# Patient Record
Sex: FEMALE | Race: WHITE
[De-identification: names, ages, dates, MRNs, and addresses within clinical notes are randomized per-mention and may not be internally consistent; named-entity substitution may affect disease eponyms.]

---

## 2017-09-11 ENCOUNTER — HOSPITAL ENCOUNTER (OUTPATIENT)
Dept: HOSPITAL 61 - MAMMO | Age: 77
Discharge: HOME | End: 2017-09-11
Attending: FAMILY MEDICINE
Payer: MEDICARE

## 2017-09-11 DIAGNOSIS — Z12.31: Primary | ICD-10-CM

## 2017-09-11 NOTE — RAD
DATE: 9/11/2017



EXAM: DIGITAL SCREEN BILAT W/CAD



HISTORY: Routine screening



COMPARISON: 9/6/2016, a 20/2/2016



This study was interpreted with the benefit of Computerized Aided Detection

(CAD).



The breast parenchyma shows scattered fibroglandular densities. Breast

parenchyma level B.



FINDINGS: The fibroglandular pattern is somewhat nodular in character. No new

or enlarging breast densities are seen. Benign type calcifications are

present. No suspicious microcalcifications have developed.



IMPRESSION: Stable mammograms without evidence of malignancy.





BI-RADS CATEGORY: 2 BENIGN FINDING(S)



RECOMMENDED FOLLOW-UP: 12M 12 MONTH FOLLOW-UP



PQRS compliance statement: Patient information was entered into a reminder

system with a target due date     for the next mammogram.



Mammography is a sensitive method for finding small breast cancers, but it

does not detect them all and is not a substitute for careful clinical

examination.  A negative mammogram does not negate a clinically suspicious

finding and should not result in delay in biopsying a clinically suspicious

abnormality.



"Our facility is accredited by the [employer] Mammography

Program."

## 2018-09-13 ENCOUNTER — HOSPITAL ENCOUNTER (OUTPATIENT)
Dept: HOSPITAL 61 - MAMMO | Age: 78
Discharge: HOME | End: 2018-09-13
Attending: FAMILY MEDICINE
Payer: MEDICARE

## 2018-09-13 DIAGNOSIS — Z12.31: Primary | ICD-10-CM

## 2018-09-13 PROCEDURE — 77063 BREAST TOMOSYNTHESIS BI: CPT

## 2018-09-13 PROCEDURE — 77067 SCR MAMMO BI INCL CAD: CPT

## 2018-09-13 NOTE — RAD
DATE: 9/13/2018



EXAM: MAMMO MUNIR SCREENING BILATERAL



HISTORY: Routine screening



COMPARISON: 9/11/2017



This study was interpreted with the benefit of Computerized Aided Detection

(CAD).





Breast Density: SCATTERED The breast parenchyma shows scattered fibroglandular

densities. Breast parenchyma level B.





FINDINGS: 2-D and 3-D tomosynthesis imaging was performed in CC and MLO

projections.  The fibroglandular tissues are multinodular in character.  No

new or enlarging breast densities are seen.  There are scattered

microcalcifications which have shown no significant interval change.  





IMPRESSION: Stable mammograms without evidence of malignancy.





BI-RADS CATEGORY: 2 BENIGN FINDING(S)



RECOMMENDED FOLLOW-UP: 12M 12 MONTH FOLLOW-UP



PQRS compliance statement: Patient information was entered into a reminder

system with a target due date     for the next mammogram.



Mammography is a sensitive method for finding small breast cancers, but it

does not detect them all and is not a substitute for careful clinical

examination.  A negative mammogram does not negate a clinically suspicious

finding and should not result in delay in biopsying a clinically suspicious

abnormality.



"Our facility is accredited by the [employer] Mammography

Program."

## 2019-02-25 ENCOUNTER — HOSPITAL ENCOUNTER (EMERGENCY)
Dept: HOSPITAL 61 - ER | Age: 79
Discharge: HOME | End: 2019-02-25
Payer: MEDICARE

## 2019-02-25 VITALS — HEIGHT: 64.5 IN | WEIGHT: 159 LBS | BODY MASS INDEX: 26.81 KG/M2

## 2019-02-25 VITALS
DIASTOLIC BLOOD PRESSURE: 85 MMHG | DIASTOLIC BLOOD PRESSURE: 85 MMHG | DIASTOLIC BLOOD PRESSURE: 85 MMHG | DIASTOLIC BLOOD PRESSURE: 85 MMHG | SYSTOLIC BLOOD PRESSURE: 151 MMHG | SYSTOLIC BLOOD PRESSURE: 151 MMHG | SYSTOLIC BLOOD PRESSURE: 151 MMHG | SYSTOLIC BLOOD PRESSURE: 151 MMHG

## 2019-02-25 DIAGNOSIS — E86.0: ICD-10-CM

## 2019-02-25 DIAGNOSIS — M10.9: ICD-10-CM

## 2019-02-25 DIAGNOSIS — R07.89: ICD-10-CM

## 2019-02-25 DIAGNOSIS — R11.0: Primary | ICD-10-CM

## 2019-02-25 DIAGNOSIS — R03.0: ICD-10-CM

## 2019-02-25 LAB
ALBUMIN SERPL-MCNC: 3.6 G/DL (ref 3.4–5)
ALBUMIN/GLOB SERPL: 0.9 {RATIO} (ref 1–1.7)
ALP SERPL-CCNC: 85 U/L (ref 46–116)
ALT SERPL-CCNC: 28 U/L (ref 14–59)
ANION GAP SERPL CALC-SCNC: 13 MMOL/L (ref 6–14)
APTT PPP: YELLOW S
AST SERPL-CCNC: 24 U/L (ref 15–37)
BACTERIA #/AREA URNS HPF: 0 /HPF
BASOPHILS # BLD AUTO: 0.1 X10^3/UL (ref 0–0.2)
BASOPHILS NFR BLD: 1 % (ref 0–3)
BILIRUB SERPL-MCNC: 1.6 MG/DL (ref 0.2–1)
BILIRUB UR QL STRIP: NEGATIVE
BUN SERPL-MCNC: 24 MG/DL (ref 7–20)
BUN/CREAT SERPL: 22 (ref 6–20)
CALCIUM SERPL-MCNC: 9.2 MG/DL (ref 8.5–10.1)
CHLORIDE SERPL-SCNC: 95 MMOL/L (ref 98–107)
CO2 SERPL-SCNC: 27 MMOL/L (ref 21–32)
CREAT SERPL-MCNC: 1.1 MG/DL (ref 0.6–1)
EOSINOPHIL NFR BLD: 0 X10^3/UL (ref 0–0.7)
EOSINOPHIL NFR BLD: 1 % (ref 0–3)
ERYTHROCYTE [DISTWIDTH] IN BLOOD BY AUTOMATED COUNT: 13.4 % (ref 11.5–14.5)
FIBRINOGEN PPP-MCNC: CLEAR MG/DL
GFR SERPLBLD BASED ON 1.73 SQ M-ARVRAT: 47.9 ML/MIN
GLOBULIN SER-MCNC: 3.8 G/DL (ref 2.2–3.8)
GLUCOSE SERPL-MCNC: 126 MG/DL (ref 70–99)
HCT VFR BLD CALC: 48.4 % (ref 36–47)
HGB BLD-MCNC: 16.7 G/DL (ref 12–15.5)
HYALINE CASTS #/AREA URNS LPF: (no result) /HPF
INFLUENZA A PATIENT: NEGATIVE
INFLUENZA B PATIENT: NEGATIVE
LYMPHOCYTES # BLD: 1.4 X10^3/UL (ref 1–4.8)
LYMPHOCYTES NFR BLD AUTO: 15 % (ref 24–48)
MCH RBC QN AUTO: 32 PG (ref 25–35)
MCHC RBC AUTO-ENTMCNC: 35 G/DL (ref 31–37)
MCV RBC AUTO: 92 FL (ref 79–100)
MONO #: 0.3 X10^3/UL (ref 0–1.1)
MONOCYTES NFR BLD: 4 % (ref 0–9)
NEUT #: 7.5 X10^3UL (ref 1.8–7.7)
NEUTROPHILS NFR BLD AUTO: 80 % (ref 31–73)
NITRITE UR QL STRIP: NEGATIVE
PH UR STRIP: 6 [PH]
PLATELET # BLD AUTO: 256 X10^3/UL (ref 140–400)
POTASSIUM SERPL-SCNC: 4 MMOL/L (ref 3.5–5.1)
PROT SERPL-MCNC: 7.4 G/DL (ref 6.4–8.2)
PROT UR STRIP-MCNC: NEGATIVE MG/DL
RBC # BLD AUTO: 5.28 X10^6/UL (ref 3.5–5.4)
RBC #/AREA URNS HPF: 0 /HPF (ref 0–2)
SODIUM SERPL-SCNC: 135 MMOL/L (ref 136–145)
SQUAMOUS #/AREA URNS LPF: (no result) /LPF
UROBILINOGEN UR-MCNC: 1 MG/DL
WBC # BLD AUTO: 9.4 X10^3/UL (ref 4–11)
WBC #/AREA URNS HPF: (no result) /HPF (ref 0–4)

## 2019-02-25 PROCEDURE — 96360 HYDRATION IV INFUSION INIT: CPT

## 2019-02-25 PROCEDURE — 96361 HYDRATE IV INFUSION ADD-ON: CPT

## 2019-02-25 PROCEDURE — 71046 X-RAY EXAM CHEST 2 VIEWS: CPT

## 2019-02-25 PROCEDURE — 81001 URINALYSIS AUTO W/SCOPE: CPT

## 2019-02-25 PROCEDURE — 87804 INFLUENZA ASSAY W/OPTIC: CPT

## 2019-02-25 PROCEDURE — 36415 COLL VENOUS BLD VENIPUNCTURE: CPT

## 2019-02-25 PROCEDURE — 80053 COMPREHEN METABOLIC PANEL: CPT

## 2019-02-25 PROCEDURE — 85025 COMPLETE CBC W/AUTO DIFF WBC: CPT

## 2019-02-25 PROCEDURE — 99284 EMERGENCY DEPT VISIT MOD MDM: CPT

## 2019-02-25 NOTE — PHYS DOC
Adult General


Chief Complaint


Chief Complaint:  WEAKNESS/GENERALIZED





HPI


HPI





Patient is a 78  year old female who presents to the due to chief complaint of 

not feeling well. She states that she has other symptoms of the last 2 weeks. 

Patient's primary care physician last week and was prescribed Levaquin. She 

developed nausea after taking the Levaquin on Friday she called her doctor's 

office and she was prescribed Zofran. Patient states that she had fever at the 

beginning of the week but has not attempted fever for the last 3 days. Patient 

denies chills, vomiting, diarrhea, dysuria, chest pain, headache, sore throat, 

runny nose. Patient states that her PCP checked her for flu, which was 

negative. Patient states that she is going to take normal for hydration. 

Patient states that she has a past medical history of high blood pressure  And 

gout. Patient states that she's takes diastolic as well as allopurinol.





Review of Systems


Review of Systems





Constitutional: Denies fever or chills 


HENT: Denies nasal congestion, sore throat, sinus tenderness


Respiratory: Denies cough or shortness of breath 


Cardiovascular: Denies CP


GI: Denies abdominal pain, nausea, vomiting or diarrhea 


: Denies dysuria or hematuria 


Musculoskeletal: Denies back pain or joint pain 


Skin: Denies rash or skin lesions


Neurologic: Denies headache, focal weakness or sensory changes





Complete systems were reviewed and found to be within normal limits, except as 

documented in this note.





Current Medications


Current Medications





Current Medications








 Medications


  (Trade)  Dose


 Ordered  Sig/Vipin  Start Time


 Stop Time Status Last Admin


Dose Admin


 


 Sodium Chloride  1,000 ml @ 


 1,000 mls/hr  1X  ONCE  2/25/19 12:45


 2/25/19 13:44 DC 2/25/19 13:33


1,000 MLS/HR











Allergies


Allergies





Allergies








Coded Allergies Type Severity Reaction Last Updated Verified


 


  No Known Drug Allergies    2/25/19 No











Physical Exam


Physical Exam





Constitutional: Well developed, well nourished, no acute distress, non-toxic 

appearance.


HENT: Normocephalic, atraumatic, normocaphalic


Eyes: PERRL, EOMI


Neck: Normal range of motion, no tenderness, supple


Cardiovascular:Heart rate regular rhythm, no murmur 


Resp:  Bilateral breath sounds clear to auscultation 


Abdomen: Soft, no tenderness, no distension


Skin: Warm, dry, no erythema, no rash.


Back: No tenderness, no CVA tenderness.


Extremities: No tenderness, ROM intact, no edema.


Neurologic: Alert and oriented X 3, normal motor function, normal sensory 

function, no focal deficits noted.


Psychologic: Affect normal, judgement normal, mood normal.





Current Patient Data


Vital Signs





 Vital Signs








  Date Time  Temp Pulse Resp B/P (MAP) Pulse Ox O2 Delivery O2 Flow Rate FiO2


 


2/25/19 12:00 97.4 76 20 102/62 (75) 98 Room Air  





 97.4       








Lab Values





 Laboratory Tests








Test


 2/25/19


12:30 2/25/19


12:40 2/25/19


12:45


 


Influenza Type A Antigen


 Negative


(NEGATIVE) 


 





 


Influenza Type B Antigen


 Negative


(NEGATIVE) 


 





 


White Blood Count


 


 9.4 x10^3/uL


(4.0-11.0) 





 


Red Blood Count


 


 5.28 x10^6/uL


(3.50-5.40) 





 


Hemoglobin


 


 16.7 g/dL


(12.0-15.5)  H 





 


Hematocrit


 


 48.4 %


(36.0-47.0)  H 





 


Mean Corpuscular Volume


 


 92 fL ()


 





 


Mean Corpuscular Hemoglobin  32 pg (25-35)   


 


Mean Corpuscular Hemoglobin


Concent 


 35 g/dL


(31-37) 





 


Red Cell Distribution Width


 


 13.4 %


(11.5-14.5) 





 


Platelet Count


 


 256 x10^3/uL


(140-400) 





 


Neutrophils (%) (Auto)  80 % (31-73)  H 


 


Lymphocytes (%) (Auto)  15 % (24-48)  L 


 


Monocytes (%) (Auto)  4 % (0-9)   


 


Eosinophils (%) (Auto)  1 % (0-3)   


 


Basophils (%) (Auto)  1 % (0-3)   


 


Neutrophils # (Auto)


 


 7.5 x10^3uL


(1.8-7.7) 





 


Lymphocytes # (Auto)


 


 1.4 x10^3/uL


(1.0-4.8) 





 


Monocytes # (Auto)


 


 0.3 x10^3/uL


(0.0-1.1) 





 


Eosinophils # (Auto)


 


 0.0 x10^3/uL


(0.0-0.7) 





 


Basophils # (Auto)


 


 0.1 x10^3/uL


(0.0-0.2) 





 


Sodium Level


 


 135 mmol/L


(136-145)  L 





 


Potassium Level


 


 4.0 mmol/L


(3.5-5.1) 





 


Chloride Level


 


 95 mmol/L


()  L 





 


Carbon Dioxide Level


 


 27 mmol/L


(21-32) 





 


Anion Gap  13 (6-14)   


 


Blood Urea Nitrogen


 


 24 mg/dL


(7-20)  H 





 


Creatinine


 


 1.1 mg/dL


(0.6-1.0)  H 





 


Estimated GFR


(Cockcroft-Gault) 


 47.9  


 





 


BUN/Creatinine Ratio  22 (6-20)  H 


 


Glucose Level


 


 126 mg/dL


(70-99)  H 





 


Calcium Level


 


 9.2 mg/dL


(8.5-10.1) 





 


Total Bilirubin


 


 1.6 mg/dL


(0.2-1.0)  H 





 


Aspartate Amino Transferase


(AST) 


 24 U/L (15-37)


 





 


Alanine Aminotransferase (ALT)


 


 28 U/L (14-59)


 





 


Alkaline Phosphatase


 


 85 U/L


() 





 


Total Protein


 


 7.4 g/dL


(6.4-8.2) 





 


Albumin


 


 3.6 g/dL


(3.4-5.0) 





 


Albumin/Globulin Ratio


 


 0.9 (1.0-1.7)


L 





 


Urine Collection Type   Unknown  


 


Urine Color   Yellow  


 


Urine Clarity   Clear  


 


Urine pH   6.0  


 


Urine Specific Gravity   1.015  


 


Urine Protein


 


 


 Negative mg/dL


(NEG-TRACE)


 


Urine Glucose (UA)


 


 


 Negative mg/dL


(NEG)


 


Urine Ketones (Stick)


 


 


 15 mg/dL (NEG)





 


Urine Blood


 


 


 Negative (NEG)





 


Urine Nitrite


 


 


 Negative (NEG)





 


Urine Bilirubin


 


 


 Negative (NEG)





 


Urine Urobilinogen Dipstick


 


 


 1.0 mg/dL (0.2


mg/dL)


 


Urine Leukocyte Esterase


 


 


 Negative (NEG)





 


Urine RBC   0 /HPF (0-2)  


 


Urine WBC


 


 


 Rare /HPF


(0-4)


 


Urine Squamous Epithelial


Cells 


 


 Occ /LPF  





 


Urine Bacteria


 


 


 0 /HPF (0-FEW)





 


Urine Hyaline Casts


 


 


 Occasional


/HPF


 


Urine Mucus   Slight /LPF  





 Laboratory Tests


2/25/19 12:40








 Laboratory Tests


2/25/19 12:40











EKG


EKG


[]





Radiology/Procedures


Radiology/Procedures


[]





Course & Med Decision Making


Course & Med Decision Making


Pertinent Labs and Imaging studies reviewed. (See chart for details)





Patient's labs are within normal limits. Urine does not show UTI.











Chest, PA and Lateral:


 


Technique:  PA and lateral views of the chest were obtained.


 


History:  Chest pain.


 


Comparison: None.


 


Findings:


 


The cardiomediastinal silhouette grossly appears unremarkable. Faint 


airspace opacity left lower lobe of the lung likely atelectasis or 


infiltrate.


 


IMPRESSION:


 


Faint left lower lobe airspace opacities likely atelectasis or infiltrate.


Follow-up to resolution.


 


Electronically signed by: Gio Marin MD (2/25/2019 1:01 PM) [facility]





DICTATED and SIGNED BY:     GIO MARIN MD


DATE:     02/25/19 8463








Chest x-ray shows no acute process. Possible infiltrate.


Patient is already on Levaquin for 10 days.





Patient states that she is feeling better. She states that she may just need 

more fluids and rest.


Patient is alert and oriented 3. No focal deficits on exam.





Patient most likely can be discharged home after IV fluids are completed.


Discussed results and plan of care with patient and family.





Patient states that she is feeling much better after IV fluids.


Comfortable to take patient home and follow up with PCP as an outpatient.





Dragon Disclaimer


Dragon Disclaimer


This electronic medical record was generated, in whole or in part, using a 

voice recognition dictation system.





Departure


Departure


Disposition:  01 HOME, SELF-CARE


Condition:  STABLE


Referrals:  


MUNIRA FARRELL MD (PCP)


Follow-up in 1-2 days


Patient Instructions:  Dehydration, Adult, Viral Infections, Easy-To-Read











JOVANNA JOHNSON DO Feb 25, 2019 12:54

## 2019-02-25 NOTE — RAD
Chest, PA and Lateral:

 

Technique:  PA and lateral views of the chest were obtained.

 

History:  Chest pain.

 

Comparison: None.

 

Findings:

 

The cardiomediastinal silhouette grossly appears unremarkable. Faint 

airspace opacity left lower lobe of the lung likely atelectasis or 

infiltrate.

 

IMPRESSION:

 

Faint left lower lobe airspace opacities likely atelectasis or infiltrate.

Follow-up to resolution.

 

Electronically signed by: Gio Marin MD (2/25/2019 1:01 PM) [facility]

## 2019-09-24 ENCOUNTER — HOSPITAL ENCOUNTER (OUTPATIENT)
Dept: HOSPITAL 61 - MAMMO | Age: 79
Discharge: HOME | End: 2019-09-24
Attending: FAMILY MEDICINE
Payer: MEDICARE

## 2019-09-24 DIAGNOSIS — N63.20: ICD-10-CM

## 2019-09-24 DIAGNOSIS — Z12.31: Primary | ICD-10-CM

## 2019-09-24 DIAGNOSIS — N63.10: ICD-10-CM

## 2019-09-24 PROCEDURE — 77067 SCR MAMMO BI INCL CAD: CPT

## 2019-09-24 PROCEDURE — 77063 BREAST TOMOSYNTHESIS BI: CPT

## 2019-09-25 NOTE — RAD
DATE: 9/24/2019



EXAM: MAMMO MUNIR SCREENING BILATERAL



HISTORY: Routine screening



COMPARISON: 8/19/2015, 8/22/2016, 9/11/2017 mammographic exams



This study was interpreted with the benefit of Computerized Aided Detection

(CAD).





Breast Density: SCATTERED The breast parenchyma shows scattered fibroglandular

densities. Breast parenchyma level B.





FINDINGS: Small masses have remained stable. No suspicious calcifications or

distortion. No dominant mass.  





IMPRESSION: Stable







BI-RADS CATEGORY: 1 NEGATIVE



RECOMMENDED FOLLOW-UP: 12M 12 MONTH FOLLOW-UP



PQRS compliance statement: Patient information was entered into a reminder

system with a target due date for the next mammogram.



Mammography is a sensitive method for finding small breast cancers, but it

does not detect them all and is not a substitute for careful clinical

examination.  A negative mammogram does not negate a clinically suspicious

finding and should not result in delay in biopsying a clinically suspicious

abnormality.



"Our facility is accredited by the [employer] Mammography

Program."

## 2020-09-29 ENCOUNTER — HOSPITAL ENCOUNTER (OUTPATIENT)
Dept: HOSPITAL 61 - MAMMO | Age: 80
Discharge: HOME | End: 2020-09-29
Attending: FAMILY MEDICINE
Payer: MEDICARE

## 2020-09-29 DIAGNOSIS — Z12.31: Primary | ICD-10-CM

## 2020-09-29 PROCEDURE — 77067 SCR MAMMO BI INCL CAD: CPT

## 2020-09-29 PROCEDURE — 77063 BREAST TOMOSYNTHESIS BI: CPT

## 2020-09-29 NOTE — RAD
BILATERAL SCREENING MAMMOGRAM, 3-D

 

History: Routine screening.

 

Comparison:  7/30/2013 and subsequent exams. 

 

Technique:  MLO and CC digital tomosynthesis (3D) images obtained. 

Radiologist reviewed these images on dedicated workstation.

 

Findings: 

Breast Tissue Density B : There are scattered areas of fibroglandular 

density.

 

There are no dominant masses, suspicious microcalcifications, or 

architectural distortion.

 

IMPRESSION:

No mammographic evidence of malignancy. Recommend routine screening.

 

BI-RADS category 1:  Negative.

 

The images were reviewed with computer-aided detection.

 

Patient information is entered into reminder system with a target due date

for the next screening mammogram.

 

Mammography is the most sensitive method for finding small breast cancers,

but it does not detect them all and is not a substitute for careful 

clinical examination. A negative mammogram does not negate a clinically 

suspicious finding and should not result in delay in biopsying a 

clinically suspicious abnormality.

 

 "Our facility is accredited by the American [employer] 

Mammography Program."

 

Electronically signed by: Tae Jackson MD (9/29/2020 6:54 PM) [id number]

## 2021-10-05 ENCOUNTER — HOSPITAL ENCOUNTER (OUTPATIENT)
Dept: HOSPITAL 61 - MAMMO | Age: 81
End: 2021-10-05
Attending: FAMILY MEDICINE
Payer: MEDICARE

## 2021-10-05 DIAGNOSIS — Z12.31: Primary | ICD-10-CM

## 2021-10-05 PROCEDURE — 77067 SCR MAMMO BI INCL CAD: CPT

## 2021-10-05 PROCEDURE — 77063 BREAST TOMOSYNTHESIS BI: CPT

## 2021-10-05 NOTE — RAD
EXAM: Bilateral digital screening mammogram with tomosynthesis.



HISTORY: 81-year-old female presents for screening mammography.



TECHNIQUE: Full-field digital craniocaudal and mediolateral oblique 2D and 3D tomosynthesis images of
 both breasts are obtained for evaluation. Computer aided detection was applied.



COMPARISON: 9/29/2020 and 9/24/2019



BREAST PARENCHYMAL DENSITY: Level B - Scattered fibroglandular densities.



FINDINGS: There is no new suspicious mass, microcalcification or region of architectural distortion. 
There are multiple stable areas of nodularity and asymmetry. There are multiple stable benign calcifi
cations.



IMPRESSION: BI-RADS Category 2: Benign finding(s). 



RECOMMENDATION: Annual mammography is recommended.



If your mammogram demonstrates that you have dense breast tissue, which could hide abnormalities, and
 if you have other risk factors for breast cancer that have been identified, you might benefit from s
upplemental screening tests that may be suggested by your ordering physician.  Dense breast tissue, i
n and of itself, is a relatively common condition.  This information is not provided to cause undue c
oncern, but rather to raise your awareness and to promote discussion with your physician regarding th
e presence of other risk factors, in addition to dense breast tissue. A report of your mammography re
sults will be sent to you and your physician.  You should contact your physician if you have any ques
tions or concerns regarding this report.  



Mammography is a sensitive method for finding small breast cancers, but it does not detect them all a
nd is not a substitute for careful clinical examination.  A negative mammogram does not negate a clin
ically suspicious finding and should not result in delay in biopsying a clinically suspicious abnorma
lity.



PQRS compliance statement -  Patient information was entered into a reminder system with a target due
 date for the next mammogram. 



"Our facility is accredited by the [employer] Mammography Program."



Electronically signed by: Lacie Villareal MD (10/5/2021 11:34 AM) [id number]